# Patient Record
Sex: FEMALE | Race: ASIAN | Employment: FULL TIME | URBAN - METROPOLITAN AREA
[De-identification: names, ages, dates, MRNs, and addresses within clinical notes are randomized per-mention and may not be internally consistent; named-entity substitution may affect disease eponyms.]

---

## 2021-10-22 ENCOUNTER — HOSPITAL ENCOUNTER (EMERGENCY)
Facility: HOSPITAL | Age: 36
Discharge: HOME | End: 2021-10-22
Attending: STUDENT IN AN ORGANIZED HEALTH CARE EDUCATION/TRAINING PROGRAM | Admitting: STUDENT IN AN ORGANIZED HEALTH CARE EDUCATION/TRAINING PROGRAM
Payer: COMMERCIAL

## 2021-10-22 ENCOUNTER — APPOINTMENT (OUTPATIENT)
Dept: RADIOLOGY | Facility: HOSPITAL | Age: 36
End: 2021-10-22
Attending: STUDENT IN AN ORGANIZED HEALTH CARE EDUCATION/TRAINING PROGRAM
Payer: COMMERCIAL

## 2021-10-22 VITALS
SYSTOLIC BLOOD PRESSURE: 123 MMHG | DIASTOLIC BLOOD PRESSURE: 63 MMHG | BODY MASS INDEX: 27.38 KG/M2 | HEIGHT: 61 IN | TEMPERATURE: 98.6 F | OXYGEN SATURATION: 99 % | HEART RATE: 97 BPM | RESPIRATION RATE: 30 BRPM | WEIGHT: 145 LBS

## 2021-10-22 DIAGNOSIS — U07.1 INFECTION DUE TO 2019 NOVEL CORONAVIRUS: ICD-10-CM

## 2021-10-22 PROBLEM — O09.899 H/O PRETERM DELIVERY, CURRENTLY PREGNANT: Status: ACTIVE | Noted: 2021-09-21

## 2021-10-22 PROBLEM — D69.6 BENIGN GESTATIONAL THROMBOCYTOPENIA (H): Status: ACTIVE | Noted: 2017-11-17

## 2021-10-22 PROBLEM — O99.119 BENIGN GESTATIONAL THROMBOCYTOPENIA (H): Status: ACTIVE | Noted: 2017-11-17

## 2021-10-22 PROBLEM — O42.919 PRETERM PREMATURE RUPTURE OF MEMBRANES (PPROM) WITH UNKNOWN ONSET OF LABOR: Status: ACTIVE | Noted: 2017-11-17

## 2021-10-22 LAB
ANION GAP SERPL CALCULATED.3IONS-SCNC: 8 MMOL/L (ref 5–18)
BUN SERPL-MCNC: 10 MG/DL (ref 8–22)
CALCIUM SERPL-MCNC: 8.5 MG/DL (ref 8.5–10.5)
CHLORIDE BLD-SCNC: 110 MMOL/L (ref 98–107)
CO2 SERPL-SCNC: 21 MMOL/L (ref 22–31)
CREAT SERPL-MCNC: 0.63 MG/DL (ref 0.6–1.1)
ERYTHROCYTE [DISTWIDTH] IN BLOOD BY AUTOMATED COUNT: 12.6 % (ref 10–15)
GFR SERPL CREATININE-BSD FRML MDRD: >90 ML/MIN/1.73M2
GLUCOSE BLD-MCNC: 102 MG/DL (ref 70–125)
HCT VFR BLD AUTO: 35.7 % (ref 35–47)
HGB BLD-MCNC: 12.1 G/DL (ref 11.7–15.7)
MCH RBC QN AUTO: 31.5 PG (ref 26.5–33)
MCHC RBC AUTO-ENTMCNC: 33.9 G/DL (ref 31.5–36.5)
MCV RBC AUTO: 93 FL (ref 78–100)
PLATELET # BLD AUTO: 167 10E3/UL (ref 150–450)
POTASSIUM BLD-SCNC: 3.6 MMOL/L (ref 3.5–5)
RBC # BLD AUTO: 3.84 10E6/UL (ref 3.8–5.2)
SODIUM SERPL-SCNC: 139 MMOL/L (ref 136–145)
WBC # BLD AUTO: 6.7 10E3/UL (ref 4–11)

## 2021-10-22 PROCEDURE — 93005 ELECTROCARDIOGRAM TRACING: CPT | Performed by: STUDENT IN AN ORGANIZED HEALTH CARE EDUCATION/TRAINING PROGRAM

## 2021-10-22 PROCEDURE — 99284 EMERGENCY DEPT VISIT MOD MDM: CPT | Mod: 25

## 2021-10-22 PROCEDURE — 71046 X-RAY EXAM CHEST 2 VIEWS: CPT

## 2021-10-22 PROCEDURE — 36415 COLL VENOUS BLD VENIPUNCTURE: CPT | Performed by: STUDENT IN AN ORGANIZED HEALTH CARE EDUCATION/TRAINING PROGRAM

## 2021-10-22 PROCEDURE — 85014 HEMATOCRIT: CPT | Performed by: STUDENT IN AN ORGANIZED HEALTH CARE EDUCATION/TRAINING PROGRAM

## 2021-10-22 PROCEDURE — 80048 BASIC METABOLIC PNL TOTAL CA: CPT | Performed by: STUDENT IN AN ORGANIZED HEALTH CARE EDUCATION/TRAINING PROGRAM

## 2021-10-22 PROCEDURE — 99285 EMERGENCY DEPT VISIT HI MDM: CPT | Mod: 25

## 2021-10-22 ASSESSMENT — MIFFLIN-ST. JEOR: SCORE: 1285.1

## 2021-10-22 NOTE — ED TRIAGE NOTES
Pt is 17 weeks gestation, pt tested positive for Covid 2 days ago.  Pt has had cough and fever, today SOB was much worse, she callled her OB and was told to come to the ED.

## 2021-10-22 NOTE — ED NOTES
The patient was seen in triage to expedite ED workup.     HPI: 17 weeks pregnant here with dyspnea. COVID+. Fully vaccinated.    MDM: Reassuring vital signs with normal SpO2. Will get CXR and basic labs as well as EKG.       Nidhi Aranda MD  10/22/21 1821

## 2021-10-23 LAB
ATRIAL RATE - MUSE: 89 BPM
DIASTOLIC BLOOD PRESSURE - MUSE: NORMAL MMHG
INTERPRETATION ECG - MUSE: NORMAL
P AXIS - MUSE: 74 DEGREES
PR INTERVAL - MUSE: 116 MS
QRS DURATION - MUSE: 74 MS
QT - MUSE: 368 MS
QTC - MUSE: 447 MS
R AXIS - MUSE: 84 DEGREES
SYSTOLIC BLOOD PRESSURE - MUSE: NORMAL MMHG
T AXIS - MUSE: 50 DEGREES
VENTRICULAR RATE- MUSE: 89 BPM

## 2021-10-23 NOTE — ED PROVIDER NOTES
Emergency Department Encounter         FINAL IMPRESSION:  COVID-19 infection.        ED COURSE AND MEDICAL DECISION MAKING     9:00 PM I met with the patient for the initial interview and physical examination. Discussed plan for treatment and workup in the ED. PPE: Provider wore gloves, N95 mask, eye protection, face shield, and surgical cap and gown.   ED Course as of Oct 22 2110   Fri Oct 22, 2021   2109 Patient is a G2, P1 at 17 weeks currently Covid +2 days ago, here with chest pain and back pain from coughing as well as shortness of breath.  No fevers, chills, nausea vomiting.  Was sent here by OB for reassurance and a physical examination.  On arrival her vitals are stable.  She looks well clinically.  Heart and lungs are normal.  Legs are normal.  Labs normal.  Chest x-ray normal.  Patient denies any vomiting or vaginal bleeding or discharge.  No leg swelling.  No hemoptysis.  No other signs or symptoms suggesting massive or submassive PE on vitals or physical examination.  Patient is very anxious as her mother  from Covid 6 months ago.  At this point reassurance was given.  We will attempt to give her a pulse oximeter here otherwise recommending patient buy one over-the-counter.        No active chest pain.  Patient states she feels well after talking through her symptoms and her diagnosis.                       At the conclusion of the encounter I discussed the results of all the tests and the disposition. The questions were answered. The patient or family acknowledged understanding and was agreeable with the care plan.                  MEDICATIONS GIVEN IN THE EMERGENCY DEPARTMENT:  Medications - No data to display    NEW PRESCRIPTIONS STARTED AT TODAY'S ED VISIT:  New Prescriptions    No medications on file       HPI     Patient information obtained from: The patient    Use of Interpretor: N/A     Mona Sherman is a 36 year old female with a history of , DEN 3/29/22 who presents to this ED via  "walk-in for evaluation of shortness of breath and chest pain.    The patient reports that 2 days ago she tested positive for COVID-19 and has been having ongoing chest pain and back pain from coughing as well as shortness of breath. She was sent here by OB for reassurance and further evaluation. The patient denies fever, chills, nausea, vomiting, vaginal bleeding, vaginal discharge, leg swelling and any other symptoms or complaints at this time.      REVIEW OF SYSTEMS:  Review of Systems   Constitutional: Negative for fever, malaise  HEENT: Negative runny nose, sore throat, ear pain, neck pain  Respiratory: Negative for congestion. Positive for shortness of breath, and cough   Cardiovascular: Negative for leg edema. Positive for chest pain  Gastrointestinal: Negative for abdominal distention, abdominal pain, constipation, vomiting, nausea, diarrhea  Genitourinary: Negative for dysuria and hematuria. Vaginal bleeding, vaginal discharge  Integument: Negative for rash, skin breakdown  Neurological: Negative for paresthesias, weakness, headache.  Musculoskeletal: Negative for joint pain, joint swelling. Positive for back pain      All other systems reviewed and are negative.      MEDICAL HISTORY     History reviewed. No pertinent past medical history.    History reviewed. No pertinent surgical history.    Social History     Tobacco Use     Smoking status: None   Substance Use Topics     Alcohol use: None     Drug use: None       No current outpatient medications on file.          PHYSICAL EXAM     /63   Pulse 97   Temp 98.6  F (37  C) (Temporal)   Resp 30   Ht 1.549 m (5' 1\")   Wt 65.8 kg (145 lb)   SpO2 99%   BMI 27.40 kg/m        PHYSICAL EXAM:     General: Patient appears well, nontoxic, comfortable. Anxious appearing.   HEENT: Moist mucous membranes, no tongue swelling.  No head trauma.  No midline neck pain.  Cardiovascular: Normal rate, normal rhythm, no extremity edema.  No appreciable murmur. Legs " normal.  Respiratory: No signs of respiratory distress, lungs are clear to auscultation bilaterally with no wheezes rhonchi or rales.  Abdominal: Soft, nontender, nondistended, no palpable masses, no guarding, no rebound  Musculoskeletal: Full range of motion of joints, no deformities appreciated.  Neurological: Alert and oriented, grossly neurologically intact.  Psychological: Normal affect and mood.  Integument: No rashes appreciated          RESULTS       Labs Ordered and Resulted from Time of ED Arrival Up to the Time of Departure from the ED   BASIC METABOLIC PANEL - Abnormal; Notable for the following components:       Result Value    Chloride 110 (*)     Carbon Dioxide (CO2) 21 (*)     All other components within normal limits   CBC WITH PLATELETS - Normal       Chest XR,  PA & LAT   Final Result   IMPRESSION: Negative chest.                        PROCEDURES:  Procedures:  Procedures       I, Immanuel Frausto am serving as a scribe to document services personally performed by Harley Fernandes DO, based on my observations and the provider's statements to me.  I, Harley Fernandes DO, attest that Immanuel Frausto is acting in a scribe capacity, has observed my performance of the services and has documented them in accordance with my direction.    Harley Fernandes DO  Emergency Medicine  [facility] EMERGENCY DEPARTMENT       Harley Fernandes DO  10/22/21 2903